# Patient Record
Sex: MALE | Race: ASIAN | NOT HISPANIC OR LATINO | URBAN - METROPOLITAN AREA
[De-identification: names, ages, dates, MRNs, and addresses within clinical notes are randomized per-mention and may not be internally consistent; named-entity substitution may affect disease eponyms.]

---

## 2018-05-30 ENCOUNTER — EMERGENCY (EMERGENCY)
Facility: HOSPITAL | Age: 23
LOS: 1 days | Discharge: HOME | End: 2018-05-30
Admitting: EMERGENCY MEDICINE
Payer: MEDICAID

## 2018-05-30 VITALS
HEART RATE: 63 BPM | RESPIRATION RATE: 14 BRPM | SYSTOLIC BLOOD PRESSURE: 145 MMHG | OXYGEN SATURATION: 100 % | DIASTOLIC BLOOD PRESSURE: 76 MMHG

## 2018-05-30 PROCEDURE — 99283 EMERGENCY DEPT VISIT LOW MDM: CPT | Mod: 25

## 2018-05-30 RX ORDER — PERMETHRIN CREAM 5% W/W 50 MG/G
30 CREAM TOPICAL
Qty: 1 | Refills: 0 | OUTPATIENT
Start: 2018-05-30

## 2018-05-30 NOTE — ED PROVIDER NOTE - MEDICAL DECISION MAKING DETAILS
22 year old male with a PMHx of ADD pw worsening pruritic rash to the webbings of fingers and groin for 5 days.  Plan: permetherin cream

## 2018-05-30 NOTE — ED ADULT TRIAGE NOTE - CHIEF COMPLAINT QUOTE
Pt complains of pruritic rash to L and R hand. Pt states seen by urgent care, tested for herpes and given po antibiotics. Pt states 1 pill and rash has increased. Pt does not recall if made contact with any plants. No known allergies. No redness, afebrile.

## 2018-05-30 NOTE — ED PROVIDER NOTE - CARE PLAN
Principal Discharge DX:	Scabies  Assessment and plan of treatment:	Massage permethrin cream thoroughly into the skin from the neck to the soles of the feet, including areas under the fingernails and toenails. Permethrin should be removed by washing (shower or bath) after 8 to 14 hours. A second application one to two weeks later may be necessary to eliminate mites and is typically performed. Keep your appointment with the Dermatologist today for further evaluation. Return to the Emergency Department for any new, worsening or concerning symptoms.

## 2018-05-30 NOTE — ED PROVIDER NOTE - OBJECTIVE STATEMENT
22 year old male with a PMHx of ADD pw worsening pruritic rash to the webbings of fingers and groin for 5 days. Pt states that he had a rash to his neck that began 1 week ago and then spread to his left then right hand then groin 2 days later. He was seen by urgent care who tested for herpes and gave him abx and hydrocortisone cream which has been helping with the itching but rash has been spreading. Pt has an appointment with a dermatologist today at 11:00 but the itching brought him in now. No change in detergents/diet. No known allergies to medications/diet. No involvement of mucosa/palms/soles. No recent travel/sick contacts. Denies n/v/f/c, CP, SOB, abdominal pain, dysuria, hematuria, melena, hematochezia, hematuria.

## 2018-05-30 NOTE — ED PROVIDER NOTE - PLAN OF CARE
Massage permethrin cream thoroughly into the skin from the neck to the soles of the feet, including areas under the fingernails and toenails. Permethrin should be removed by washing (shower or bath) after 8 to 14 hours. A second application one to two weeks later may be necessary to eliminate mites and is typically performed. Keep your appointment with the Dermatologist today for further evaluation. Return to the Emergency Department for any new, worsening or concerning symptoms.

## 2020-01-29 NOTE — ED PROVIDER NOTE - CROS ED [facility] ALL NEG
gps rn note

patient c/o generalized body pain. tylenol 650mg PO administered as ordered negative...

## 2022-04-01 ENCOUNTER — TRANSCRIPTION ENCOUNTER (OUTPATIENT)
Age: 27
End: 2022-04-01

## 2025-01-25 NOTE — ED PROVIDER NOTE - SKIN LATERALITY #1
Chronic.  Stable.  The patients 3 most recent labs are listed below.  Recent Labs     01/24/25  1334   PLT 84*     Plan  -Will transfuse if platelet count is <10k.  -Continue to monitor     bilateral